# Patient Record
Sex: MALE | Race: BLACK OR AFRICAN AMERICAN | NOT HISPANIC OR LATINO | Employment: OTHER | URBAN - METROPOLITAN AREA
[De-identification: names, ages, dates, MRNs, and addresses within clinical notes are randomized per-mention and may not be internally consistent; named-entity substitution may affect disease eponyms.]

---

## 2017-01-18 ENCOUNTER — RECORDS - HEALTHEAST (OUTPATIENT)
Dept: LAB | Facility: CLINIC | Age: 72
End: 2017-01-18

## 2017-01-18 LAB
CHOLEST SERPL-MCNC: 136 MG/DL
FASTING STATUS PATIENT QL REPORTED: ABNORMAL
HDLC SERPL-MCNC: 33 MG/DL
LDLC SERPL CALC-MCNC: 69 MG/DL
TRIGL SERPL-MCNC: 172 MG/DL

## 2018-04-13 ENCOUNTER — RECORDS - HEALTHEAST (OUTPATIENT)
Dept: LAB | Facility: CLINIC | Age: 73
End: 2018-04-13

## 2018-04-13 ENCOUNTER — HOSPITAL ENCOUNTER (OUTPATIENT)
Dept: LAB | Age: 73
Setting detail: SPECIMEN
Discharge: HOME | End: 2018-04-13

## 2018-04-13 LAB
ANION GAP SERPL CALCULATED.3IONS-SCNC: 9 MMOL/L (ref 5–18)
BUN SERPL-MCNC: 14 MG/DL (ref 8–28)
CALCIUM SERPL-MCNC: 8.1 MG/DL (ref 8.5–10.5)
CHLORIDE BLD-SCNC: 104 MMOL/L (ref 98–107)
CHOLEST SERPL-MCNC: 124 MG/DL
CO2 SERPL-SCNC: 28 MMOL/L (ref 22–31)
CREAT SERPL-MCNC: 0.76 MG/DL (ref 0.7–1.3)
FASTING STATUS PATIENT QL REPORTED: ABNORMAL
GFR SERPL CREATININE-BSD FRML MDRD: >60 ML/MIN/1.73M2
GLUCOSE BLD-MCNC: 82 MG/DL (ref 70–125)
HDLC SERPL-MCNC: 32 MG/DL
LDLC SERPL CALC-MCNC: 60 MG/DL
POTASSIUM BLD-SCNC: 3.7 MMOL/L (ref 3.5–5)
SODIUM SERPL-SCNC: 141 MMOL/L (ref 136–145)
TRIGL SERPL-MCNC: 160 MG/DL

## 2019-01-01 ENCOUNTER — RECORDS - HEALTHEAST (OUTPATIENT)
Dept: RADIOLOGY | Facility: CLINIC | Age: 74
End: 2019-01-01

## 2019-01-01 ENCOUNTER — RECORDS - HEALTHEAST (OUTPATIENT)
Dept: LAB | Facility: CLINIC | Age: 74
End: 2019-01-01

## 2019-01-01 ENCOUNTER — ANESTHESIA - HEALTHEAST (OUTPATIENT)
Dept: SURGERY | Facility: CLINIC | Age: 74
End: 2019-01-01

## 2019-01-01 ENCOUNTER — RECORDS - HEALTHEAST (OUTPATIENT)
Dept: ADMINISTRATIVE | Facility: OTHER | Age: 74
End: 2019-01-01

## 2019-01-01 ENCOUNTER — SURGERY - HEALTHEAST (OUTPATIENT)
Dept: SURGERY | Facility: CLINIC | Age: 74
End: 2019-01-01

## 2019-01-01 ENCOUNTER — HOSPITAL ENCOUNTER (OUTPATIENT)
Dept: CT IMAGING | Facility: CLINIC | Age: 74
Discharge: HOME | End: 2019-09-23
Attending: SURGERY

## 2019-01-01 ENCOUNTER — HOSPITAL ENCOUNTER (OUTPATIENT)
Dept: CT IMAGING | Facility: CLINIC | Age: 74
Discharge: HOME | End: 2019-09-30
Attending: SURGERY | Admitting: RADIOLOGY

## 2019-01-01 DIAGNOSIS — C18.9 COLON CANCER (H): ICD-10-CM

## 2019-01-01 LAB
ALBUMIN SERPL-MCNC: 2.2 G/DL (ref 3.5–5)
ALP SERPL-CCNC: 65 U/L (ref 45–120)
ALT SERPL W P-5'-P-CCNC: <9 U/L (ref 0–45)
ANION GAP SERPL CALCULATED.3IONS-SCNC: 10 MMOL/L (ref 5–18)
ANION GAP SERPL CALCULATED.3IONS-SCNC: 11 MMOL/L (ref 5–18)
ANION GAP SERPL CALCULATED.3IONS-SCNC: 11 MMOL/L (ref 5–18)
ANION GAP SERPL CALCULATED.3IONS-SCNC: 8 MMOL/L (ref 5–18)
ANION GAP SERPL CALCULATED.3IONS-SCNC: 9 MMOL/L (ref 5–18)
AST SERPL W P-5'-P-CCNC: 19 U/L (ref 0–40)
BACTERIA SPEC CULT: NO GROWTH
BACTERIA SPEC CULT: NORMAL
BILIRUB SERPL-MCNC: 0.2 MG/DL (ref 0–1)
BUN SERPL-MCNC: 10 MG/DL (ref 8–28)
BUN SERPL-MCNC: 12 MG/DL (ref 8–28)
BUN SERPL-MCNC: 12 MG/DL (ref 8–28)
BUN SERPL-MCNC: 13 MG/DL (ref 8–28)
BUN SERPL-MCNC: 13 MG/DL (ref 8–28)
C REACTIVE PROTEIN LHE: 3.1 MG/DL (ref 0–0.8)
CALCIUM SERPL-MCNC: 7.7 MG/DL (ref 8.5–10.5)
CALCIUM SERPL-MCNC: 7.9 MG/DL (ref 8.5–10.5)
CALCIUM SERPL-MCNC: 7.9 MG/DL (ref 8.5–10.5)
CALCIUM SERPL-MCNC: 8 MG/DL (ref 8.5–10.5)
CALCIUM SERPL-MCNC: 8.5 MG/DL (ref 8.5–10.5)
CHLORIDE BLD-SCNC: 100 MMOL/L (ref 98–107)
CHLORIDE BLD-SCNC: 100 MMOL/L (ref 98–107)
CHLORIDE BLD-SCNC: 97 MMOL/L (ref 98–107)
CHLORIDE BLD-SCNC: 97 MMOL/L (ref 98–107)
CHLORIDE BLD-SCNC: 99 MMOL/L (ref 98–107)
CO2 SERPL-SCNC: 32 MMOL/L (ref 22–31)
CO2 SERPL-SCNC: 33 MMOL/L (ref 22–31)
CO2 SERPL-SCNC: 37 MMOL/L (ref 22–31)
CREAT SERPL-MCNC: 0.64 MG/DL (ref 0.7–1.3)
CREAT SERPL-MCNC: 0.67 MG/DL (ref 0.7–1.3)
CREAT SERPL-MCNC: 0.69 MG/DL (ref 0.7–1.3)
CREAT SERPL-MCNC: 0.71 MG/DL (ref 0.7–1.3)
CREAT SERPL-MCNC: 0.71 MG/DL (ref 0.7–1.3)
ERYTHROCYTE [SEDIMENTATION RATE] IN BLOOD BY WESTERGREN METHOD: 61 MM/HR (ref 0–15)
G LAMBLIA AG STL QL IA: NORMAL
GFR SERPL CREATININE-BSD FRML MDRD: >60 ML/MIN/1.73M2
GLUCOSE BLD-MCNC: 86 MG/DL (ref 70–125)
GLUCOSE BLD-MCNC: 86 MG/DL (ref 70–125)
GLUCOSE BLD-MCNC: 90 MG/DL (ref 70–125)
GLUCOSE BLD-MCNC: 95 MG/DL (ref 70–125)
GLUCOSE BLD-MCNC: 98 MG/DL (ref 70–125)
HGB BLD-MCNC: 8.8 G/DL (ref 14–18)
INR PPP: 1.16 (ref 0.9–1.1)
O+P STL MICRO: NORMAL
PLATELET # BLD AUTO: 304 THOU/UL (ref 140–440)
POTASSIUM BLD-SCNC: 3 MMOL/L (ref 3.5–5)
POTASSIUM BLD-SCNC: 3.2 MMOL/L (ref 3.5–5)
POTASSIUM BLD-SCNC: 3.3 MMOL/L (ref 3.5–5)
POTASSIUM BLD-SCNC: 3.5 MMOL/L (ref 3.5–5)
POTASSIUM BLD-SCNC: 3.7 MMOL/L (ref 3.5–5)
PROT SERPL-MCNC: 5.6 G/DL (ref 6–8)
SHIGA TOXIN 1: NEGATIVE
SHIGA TOXIN 2: NEGATIVE
SODIUM SERPL-SCNC: 141 MMOL/L (ref 136–145)
SODIUM SERPL-SCNC: 142 MMOL/L (ref 136–145)
SODIUM SERPL-SCNC: 143 MMOL/L (ref 136–145)
TSH SERPL DL<=0.005 MIU/L-ACNC: 1.46 UIU/ML (ref 0.3–5)

## 2019-01-01 ASSESSMENT — MIFFLIN-ST. JEOR
SCORE: 1526.44
SCORE: 1536.08
SCORE: 1541.18
SCORE: 1533.24

## 2019-04-22 ENCOUNTER — RECORDS - HEALTHEAST (OUTPATIENT)
Dept: LAB | Facility: CLINIC | Age: 74
End: 2019-04-22

## 2019-04-22 LAB
ANION GAP SERPL CALCULATED.3IONS-SCNC: 13 MMOL/L (ref 5–18)
BUN SERPL-MCNC: 11 MG/DL (ref 8–28)
CALCIUM SERPL-MCNC: 8.2 MG/DL (ref 8.5–10.5)
CHLORIDE BLD-SCNC: 104 MMOL/L (ref 98–107)
CHOLEST SERPL-MCNC: 111 MG/DL
CO2 SERPL-SCNC: 27 MMOL/L (ref 22–31)
CREAT SERPL-MCNC: 0.72 MG/DL (ref 0.7–1.3)
FASTING STATUS PATIENT QL REPORTED: ABNORMAL
GFR SERPL CREATININE-BSD FRML MDRD: >60 ML/MIN/1.73M2
GLUCOSE BLD-MCNC: 77 MG/DL (ref 70–125)
HDLC SERPL-MCNC: 33 MG/DL
LDLC SERPL CALC-MCNC: 62 MG/DL
POTASSIUM BLD-SCNC: 3.4 MMOL/L (ref 3.5–5)
SODIUM SERPL-SCNC: 144 MMOL/L (ref 136–145)
TRIGL SERPL-MCNC: 79 MG/DL

## 2020-01-01 ENCOUNTER — HOME CARE/HOSPICE - HEALTHEAST (OUTPATIENT)
Dept: HOSPICE | Facility: HOSPICE | Age: 75
End: 2020-01-01

## 2020-01-01 ENCOUNTER — RECORDS - HEALTHEAST (OUTPATIENT)
Dept: ADMINISTRATIVE | Facility: OTHER | Age: 75
End: 2020-01-01

## 2020-01-01 ENCOUNTER — RECORDS - HEALTHEAST (OUTPATIENT)
Dept: LAB | Facility: CLINIC | Age: 75
End: 2020-01-01

## 2020-01-01 ENCOUNTER — AMBULATORY - HEALTHEAST (OUTPATIENT)
Dept: HOSPICE | Facility: HOSPICE | Age: 75
End: 2020-01-01

## 2020-01-01 LAB
ANION GAP SERPL CALCULATED.3IONS-SCNC: 12 MMOL/L (ref 5–18)
ANION GAP SERPL CALCULATED.3IONS-SCNC: 13 MMOL/L (ref 5–18)
BACTERIA SPEC CULT: NORMAL
BASOPHILS # BLD AUTO: 0 THOU/UL (ref 0–0.2)
BASOPHILS NFR BLD AUTO: 0 % (ref 0–2)
BUN SERPL-MCNC: 11 MG/DL (ref 8–28)
BUN SERPL-MCNC: 8 MG/DL (ref 8–28)
CALCIUM SERPL-MCNC: 8.6 MG/DL (ref 8.5–10.5)
CALCIUM SERPL-MCNC: 8.7 MG/DL (ref 8.5–10.5)
CAP COMMENT: ABNORMAL
CHLORIDE BLD-SCNC: 96 MMOL/L (ref 98–107)
CHLORIDE BLD-SCNC: 97 MMOL/L (ref 98–107)
CO2 SERPL-SCNC: 33 MMOL/L (ref 22–31)
CO2 SERPL-SCNC: 33 MMOL/L (ref 22–31)
CREAT SERPL-MCNC: 0.57 MG/DL (ref 0.7–1.3)
CREAT SERPL-MCNC: 0.72 MG/DL (ref 0.7–1.3)
EOSINOPHIL # BLD AUTO: 0.1 THOU/UL (ref 0–0.4)
EOSINOPHIL NFR BLD AUTO: 1 % (ref 0–6)
ERYTHROCYTE [DISTWIDTH] IN BLOOD BY AUTOMATED COUNT: 16.7 % (ref 11–14.5)
FERRITIN SERPL-MCNC: 91 NG/ML (ref 27–300)
GFR SERPL CREATININE-BSD FRML MDRD: >60 ML/MIN/1.73M2
GFR SERPL CREATININE-BSD FRML MDRD: >60 ML/MIN/1.73M2
GLUCOSE BLD-MCNC: 109 MG/DL (ref 70–125)
GLUCOSE BLD-MCNC: 109 MG/DL (ref 70–125)
HCT VFR BLD AUTO: 28.6 % (ref 40–54)
HGB BLD-MCNC: 8.1 G/DL (ref 14–18)
IRON SATN MFR SERPL: 6 % (ref 20–50)
IRON SERPL-MCNC: 15 UG/DL (ref 42–175)
LAB AP CHARGES (HE HISTORICAL CONVERSION): ABNORMAL
LAB AP CHARGES (HE HISTORICAL CONVERSION): NORMAL
LAB AP INITIAL CYTO EVAL (HE HISTORICAL CONVERSION): ABNORMAL
LAB MED GENERAL PATH INTERP (HE HISTORICAL CONVERSION): ABNORMAL
LYMPHOCYTES # BLD AUTO: 1.8 THOU/UL (ref 0.8–4.4)
LYMPHOCYTES NFR BLD AUTO: 30 % (ref 20–40)
MCH RBC QN AUTO: 20.7 PG (ref 27–34)
MCHC RBC AUTO-ENTMCNC: 28.3 G/DL (ref 32–36)
MCV RBC AUTO: 73 FL (ref 80–100)
MONOCYTES # BLD AUTO: 0.5 THOU/UL (ref 0–0.9)
MONOCYTES NFR BLD AUTO: 8 % (ref 2–10)
NEUTROPHILS # BLD AUTO: 3.6 THOU/UL (ref 2–7.7)
NEUTROPHILS NFR BLD AUTO: 60 % (ref 50–70)
OVALOCYTES: ABNORMAL
PATH REPORT.ADDENDUM SPEC: ABNORMAL
PATH REPORT.COMMENTS IMP SPEC: ABNORMAL
PATH REPORT.COMMENTS IMP SPEC: ABNORMAL
PATH REPORT.COMMENTS IMP SPEC: NORMAL
PATH REPORT.COMMENTS IMP SPEC: NORMAL
PATH REPORT.FINAL DX SPEC: ABNORMAL
PATH REPORT.FINAL DX SPEC: NORMAL
PATH REPORT.MICROSCOPIC SPEC OTHER STN: ABNORMAL
PATH REPORT.MICROSCOPIC SPEC OTHER STN: NORMAL
PATH REPORT.RELEVANT HX SPEC: ABNORMAL
PATH REPORT.RELEVANT HX SPEC: NORMAL
PLAT MORPH BLD: NORMAL
PLATELET # BLD AUTO: 267 THOU/UL (ref 140–440)
PMV BLD AUTO: 10.9 FL (ref 8.5–12.5)
POTASSIUM BLD-SCNC: 3.2 MMOL/L (ref 3.5–5)
POTASSIUM BLD-SCNC: 3.4 MMOL/L (ref 3.5–5)
RBC # BLD AUTO: 3.92 MILL/UL (ref 4.4–6.2)
SODIUM SERPL-SCNC: 142 MMOL/L (ref 136–145)
SODIUM SERPL-SCNC: 142 MMOL/L (ref 136–145)
SPECIMEN DESCRIPTION: ABNORMAL
TIBC SERPL-MCNC: 259 UG/DL (ref 313–563)
TRANSFERRIN SERPL-MCNC: 207 MG/DL (ref 212–360)
WBC: 6 THOU/UL (ref 4–11)

## 2020-01-01 ASSESSMENT — MIFFLIN-ST. JEOR: SCORE: 1512.83

## 2020-07-17 ENCOUNTER — HOME CARE/HOSPICE - HEALTHEAST (OUTPATIENT)
Dept: HOSPICE | Facility: HOSPICE | Age: 75
End: 2020-07-17

## 2021-05-27 VITALS
SYSTOLIC BLOOD PRESSURE: 140 MMHG | RESPIRATION RATE: 20 BRPM | TEMPERATURE: 95.9 F | OXYGEN SATURATION: 99 % | HEART RATE: 77 BPM | DIASTOLIC BLOOD PRESSURE: 60 MMHG

## 2021-06-01 NOTE — H&P
Admission History & Physical  Jay Guzman, 1945, [id number]    [facility]  Yennifer Rodriguez PA-C, [phone]   Code Status:  No Order   Extended Emergency Contact Information  Primary Emergency Contact: JESS GUZMAN  Mobile Phone: [phone]  Relation: Child  Secondary Emergency Contact: dec,dec  Relation: Declined     Assessment and Plan:   Very pleasant 74 year old male with abdominal mass presenting to radiology for tissue biopsy.       Chief Complaint: Abdominal mass     HPI:    Jay Guzman is a 74 y.o. old male presents with abdominal mass to radiology for tissue biopsy.   History is provided by patient    Medical History  There are no active non-hospital problems to display for this patient.    Past Medical History:   Diagnosis Date     Abdominal mass      Anemia      Coronary artery disease      Hyperlipidemia      Hypertension      Hypokalemia      Renal mass      There are no active problems to display for this patient.    Surgical History  He  has a past surgical history that includes Colonoscopy and Coronary angioplasty with stent (2013).     Past Surgical History:   Procedure Laterality Date     COLONOSCOPY       CORONARY ANGIOPLASTY WITH STENT PLACEMENT  2013    Social History  Reviewed, and he  reports that he has never smoked. He has never used smokeless tobacco. He reports previous alcohol use. He reports that he does not use drugs.  Social History     Tobacco Use     Smoking status: Never Smoker     Smokeless tobacco: Never Used   Substance Use Topics     Alcohol use: Not Currently      Allergies  No Known Allergies Family History  Reviewed, and family history is not on file.   Psychosocial Needs  Social History     Patient does not qualify to have social determinant information on file (likely too young).   Social History Narrative     Not on file     Additional psychosocial needs reviewed per nursing assessment.       Prior to Admission Medications   PTA Med List  "  Medication Sig Last Dose     aspirin 81 MG EC tablet Take 81 mg by mouth daily. 9/23/2019     ferrous gluconate 324 mg (37.5 mg iron) Tab Take 1 tablet by mouth 2 (two) times a day. 9/29/2019 at Unknown time     fluticasone propionate (FLONASE) 50 mcg/actuation nasal spray Apply 1 spray into each nostril daily. Past Week at Unknown time     hydroCHLOROthiazide (HYDRODIURIL) 12.5 MG tablet Take 12.5 mg by mouth daily. 9/29/2019 at Unknown time     irbesartan (AVAPRO) 150 MG tablet Take 150 mg by mouth at bedtime. 9/29/2019 at Unknown time     potassium chloride (KLOR-CON) 10 MEQ CR tablet Take 10 mEq by mouth 2 (two) times a day. 9/29/2019 at Unknown time     simvastatin (ZOCOR) 20 MG tablet Take 20 mg by mouth at bedtime. 9/29/2019 at Unknown time           Review of Systems    /74 (Patient Position: Sitting)   Pulse 96   Temp 98.1  F (36.7  C) (Oral)   Resp 18   Ht 5' 8.5\" (1.74 m)   Wt 179 lb 6 oz (81.4 kg)   SpO2 100%   BMI 26.88 kg/m      Physical Exam     Cardiovascular: heart RRR  Lungs: clear  Abdomen: mass like palpable process noted    Results:    Lab Results personally reviewed  Imaging Results personally reviewed   Discussed with patient   Reviewed old records               "

## 2021-06-01 NOTE — PROCEDURES
[facility]    Procedure: Right upper quadrant mass biopsy with CT guidance and moderate sedation  Date/Time: 9/30/2019 9:40 AM  Performed by: Horacio Garnica MD  Authorized by: Horacio Garnica MD       Universal Protocol    Site marked: Yes    Prior images obtained and reviewed: Yes    Required items: required blood products, implants, devices, and special equipment available    Patient identity confirmed: verbally with patient, provided demographic data and arm band    Reevaluation: Patient was reevaluated immediately before administering moderate or deep sedation or anesthesia    Confirmation checklist: patient's identity using two indicators, relevant allergies, procedure was appropriate and matched the consent or emergent situation and correct equipment/implants were available    Time out: Immediately prior to procedure a time out was called to verify the correct patient, procedure, equipment, support staff and site/side marked as required    Universal Protocol: Joint Commission Universal Protocol was followed    Preparation: Patient was prepped and draped in the usual sterile fashion    Anesthesia    Local anesthesia used?: Yes    Local anesthetic: lidocaine 1% without epinephrine    Anesthetic total (mL): 10    Sedation    Patient sedation: Yes    Sedation: fentanyl and midazolam    Vital signs: Vital signs monitored during sedation    Post-procedure    Description of procedure: CT-guided right upper quadrant mass biopsy performed. Moderate sedation utilized (1 mg versed and 50 mcg Fentanyl). Patient prepped and draped in usual sterile fashion. Appropriate timeout and patient identification performed. Three 20-gauge core biopsies obtained of the right upper quadrant mass. No complications. No significant blood loss. Patient tolerated the procedure well.    Patient tolerance: Patient tolerated the procedure well with no immediate complications   Length of time physician present for 1:1  monitoring during sedation: 15

## 2021-06-01 NOTE — PRE-PROCEDURE
Procedure Name: Right upper quadrant mass biopsy with moderate sedation  Date/Time: 9/30/2019 8:35 AM  Written consent obtained?: Yes  Risks and benefits: Risks, benefits and alternatives were discussed  Consent given by: patient  Expected level of sedation: moderate  ASA Class: Class 2- mild systemic disease, no acute problems, no functional limitations  Mallampati: Grade 2- soft palate, base of uvula, tonsillar pillars, and portion of posterior pharyngeal wall visible  Patient states understanding of procedure being performed: Yes  Patient's understanding of procedure matches consent: Yes  Procedure consent matches procedure scheduled: Yes  Appropriately NPO: yes  Lungs: lungs clear with good breath sounds bilaterally  Heart: normal heart sounds and rate  History & Physical reviewed: History and physical reviewed and no updates needed  Statement of review: I have reviewed the lab findings, diagnostic data, medications, and the plan for sedation

## 2021-06-02 NOTE — ANESTHESIA POSTPROCEDURE EVALUATION
Patient: Lucretia Velasquez  RIGHT COLECTOMY  Anesthesia type: general    Patient location: PACU  Last vitals:   Vitals Value Taken Time   /66 11/2/2019  3:07 PM   Temp 37  C (98.6  F) 11/2/2019  3:07 PM   Pulse 89 11/2/2019  3:07 PM   Resp 18 11/2/2019  3:07 PM   SpO2 96 % 11/2/2019  3:07 PM     Post vital signs: stable  Level of consciousness: lethargic and responds to simple questions  Post-anesthesia pain: pain controlled  Post-anesthesia nausea and vomiting: no  Pulmonary: unassisted, return to baseline  Cardiovascular: stable and blood pressure at baseline  Hydration: adequate  Anesthetic events: no    QCDR Measures:  ASA# 11 - Carlita-op Cardiac Arrest: ASA11B - Patient did NOT experience unanticipated cardiac arrest  ASA# 12 - Carlita-op Mortality Rate: ASA12B - Patient did NOT die  ASA# 13 - PACU Re-Intubation Rate: ASA13B - Patient did NOT require a new airway mgmt  ASA# 10 - Composite Anes Safety: ASA10A - No serious adverse event    Additional Notes:

## 2021-06-02 NOTE — ANESTHESIA CARE TRANSFER NOTE
Patient spontaneously breathing.  Tidal volumes > 400ml.  Following commands, suctioned and extubated with balloon down.  O2 via mask at 10L.  To PACU, VSS, SBAR report to RN per institutional handoff policy and procedure.  Transfer of care.      Last vitals:   Vitals:    11/01/19 1319   BP: 169/77   Pulse: 70   Resp: 18   Temp: 36.7  C (98.1  F)   SpO2: 100%     Patient's level of consciousness is drowsy  Spontaneous respirations: yes  Maintains airway independently: yes  Dentition unchanged: yes  Oropharynx: oropharynx clear of all foreign objects    QCDR Measures:  ASA# 20 - Surgical Safety Checklist: WHO surgical safety checklist completed prior to induction    PQRS# 430 - Adult PONV Prevention: 4558F - Pt received => 2 anti-emetic agents (different classes) preop & intraop  ASA# 8 - Peds PONV Prevention: NA - Not pediatric patient, not GA or 2 or more risk factors NOT present  PQRS# 424 - Carlita-op Temp Management: 4559F - At least one body temp DOCUMENTED => 35.5C or 95.9F within required timeframe  PQRS# 426 - PACU Transfer Protocol: - Transfer of care checklist used  ASA# 14 - Acute Post-op Pain: ASA14B - Patient did NOT experience pain >= 7 out of 10

## 2021-06-02 NOTE — ANESTHESIA PREPROCEDURE EVALUATION
Anesthesia Evaluation      Patient summary reviewed   No history of anesthetic complications     Airway   Mallampati: II  Neck ROM: full   Pulmonary - negative ROS and normal exam    breath sounds clear to auscultation  (-) shortness of breath, sleep apnea, not a smoker                         Cardiovascular - normal exam  Exercise tolerance: > or = 4 METS  (+) hypertension, CAD, CABG/stent, , hypercholesterolemia,     (-) angina, murmur  ECG reviewed  Rhythm: regular  Rate: normal,    no murmur   ROS comment: Pre exercise echocardiographic imaging shows EF of 60 and post exercise   echocardiographic imaging show hyperdynamic LV function.    Resting EKG: normal EKG, normal sinus rhythm.    Stress EKG demonstrated no ischemic or ST segment changes over baseline.     Screening doppler is negative for significant valvular disease.    Conclusion:  1. Reduced effort tolerance likely due to deconditioning.  2. Adequate stress for interpretation with patient achieving 100 percent   of predicted maximum HR.  3. No subjective, hemodynamic or ECG evidence of inducible ischemia.  4. No echocardiographic evidence of ischemia.    Comments: I see no concerns with upcoming surgery despite his previous   coronary disease and stenting.      Neuro/Psych - negative ROS     Endo/Other - negative ROS      GI/Hepatic/Renal    (+) GERD well controlled,       Comments: Renal mass          Dental    (+) caps                       Anesthesia Plan  Planned anesthetic: general endotracheal    ASA 3   Induction: intravenous   Anesthetic plan and risks discussed with: patient and spouse  Anesthesia plan special considerations: antiemetics,   Post-op plan: routine recovery

## 2021-06-03 VITALS — WEIGHT: 179.38 LBS | HEIGHT: 69 IN | BODY MASS INDEX: 26.57 KG/M2

## 2021-06-03 VITALS — HEIGHT: 68 IN | BODY MASS INDEX: 27.35 KG/M2 | WEIGHT: 180.5 LBS

## 2021-06-04 VITALS
RESPIRATION RATE: 17 BRPM | HEIGHT: 70 IN | WEIGHT: 169 LBS | DIASTOLIC BLOOD PRESSURE: 68 MMHG | BODY MASS INDEX: 24.2 KG/M2 | OXYGEN SATURATION: 97 % | SYSTOLIC BLOOD PRESSURE: 140 MMHG | HEART RATE: 81 BPM | TEMPERATURE: 98.7 F

## 2021-06-16 PROBLEM — I10 ESSENTIAL HYPERTENSION: Status: ACTIVE | Noted: 2019-01-01

## 2021-06-16 PROBLEM — D64.9 ANEMIA: Status: ACTIVE | Noted: 2019-01-01

## 2021-06-16 PROBLEM — I25.10 CAD (CORONARY ARTERY DISEASE): Status: ACTIVE | Noted: 2019-01-01

## 2021-06-16 PROBLEM — C18.9 COLON CANCER (H): Status: ACTIVE | Noted: 2019-01-01

## 2021-06-16 PROBLEM — D36.9: Status: ACTIVE | Noted: 2019-01-01

## 2021-06-16 PROBLEM — E87.6 HYPOKALEMIA: Status: ACTIVE | Noted: 2019-01-01

## 2021-06-16 PROBLEM — E78.2 MIXED HYPERLIPIDEMIA: Status: ACTIVE | Noted: 2019-01-01

## 2021-06-16 PROBLEM — I25.10 ATHEROSCLEROTIC HEART DISEASE OF NATIVE CORONARY ARTERY WITHOUT ANGINA PECTORIS: Status: ACTIVE | Noted: 2019-01-01
